# Patient Record
Sex: FEMALE
[De-identification: names, ages, dates, MRNs, and addresses within clinical notes are randomized per-mention and may not be internally consistent; named-entity substitution may affect disease eponyms.]

---

## 2021-06-25 ENCOUNTER — NURSE TRIAGE (OUTPATIENT)
Dept: OTHER | Facility: CLINIC | Age: 26
End: 2021-06-25

## 2021-06-25 NOTE — TELEPHONE ENCOUNTER
Brief description of triage: Patient has developed upper center abdominal pain approx 5 hours ago that has been constant since onset. Current pain level is 7/10. Denies any vomiting, diarrhea or fever. No abdominal distention per caller. See assessment below. Triage indicates for patient to see provider within 4 hours    Care advice provided, patient verbalizes understanding; denies any other questions or concerns; instructed to call back for any new or worsening symptoms. This triage is a result of a call to [address] [place] [address]. Please do not respond to the triage nurse through this encounter. Any subsequent communication should be directly with the patient. Reason for Disposition   Pain is mainly in upper abdomen  (if needed ask: \"is it mainly above the belly button? \")   [1] MILD-MODERATE pain AND [2] constant AND [3] present > 2 hours    Answer Assessment - Initial Assessment Questions  1. LOCATION: \"Where does it hurt? \"       - Center upper abdomen below chest.     2. RADIATION: \"Does the pain shoot anywhere else? \" (e.g., chest, back)      - Denies radiating anywhere else. Does feel some discomfort in her esophagus     3. ONSET: \"When did the pain begin? \" (e.g., minutes, hours or days ago)       - 2300 last night. 4. SUDDEN: \"Gradual or sudden onset? \"      - Sudden onset. 5. PATTERN \"Does the pain come and go, or is it constant? \"     - If constant: \"Is it getting better, staying the same, or worsening? \"       (Note: Constant means the pain never goes away completely; most serious pain is constant and it progresses)      - If intermittent: \"How long does it last?\" \"Do you have pain now? \"      (Note: Intermittent means the pain goes away completely between bouts)      - Constant. 6. SEVERITY: \"How bad is the pain? \"  (e.g., Scale 1-10; mild, moderate, or severe)    - MILD (1-3): doesn't interfere with normal activities, abdomen soft and not tender to touch     - MODERATE (4-7): interferes with normal activities or awakens from sleep, tender to touch     - SEVERE (8-10): excruciating pain, doubled over, unable to do any normal activities       - Current pain level is 7/10    7. RECURRENT SYMPTOM: \"Have you ever had this type of abdominal pain before? \" If so, ask: \"When was the last time? \" and \"What happened that time? \"       - Never had this before. 8. CAUSE: \"What do you think is causing the abdominal pain? \"      - Unsure of cause. 9. RELIEVING/AGGRAVATING FACTORS: \"What makes it better or worse? \" (e.g., movement, antacids, bowel movement)      - Hasnt noted anything. 10. OTHER SYMPTOMS: \"Has there been any vomiting, diarrhea, constipation, or urine problems? \"        - Nausea, no vomiting, no diarrhea.  Metallic taste    Protocols used: ABDOMINAL PAIN - FEMALE-ADULT-AH, ABDOMINAL PAIN - UPPER-ADULT-AH